# Patient Record
Sex: FEMALE | Race: WHITE | Employment: FULL TIME | URBAN - METROPOLITAN AREA
[De-identification: names, ages, dates, MRNs, and addresses within clinical notes are randomized per-mention and may not be internally consistent; named-entity substitution may affect disease eponyms.]

---

## 2019-01-01 ENCOUNTER — HOSPITAL ENCOUNTER (OUTPATIENT)
Dept: MAMMOGRAPHY | Age: 59
Discharge: HOME | End: 2019-11-14
Attending: INTERNAL MEDICINE
Payer: COMMERCIAL

## 2019-01-01 ENCOUNTER — APPOINTMENT (OUTPATIENT)
Dept: MAMMOGRAPHY | Age: 59
End: 2019-01-01
Attending: INTERNAL MEDICINE
Payer: COMMERCIAL

## 2019-01-01 DIAGNOSIS — R92.8 ABNORMAL MAMMOGRAM: ICD-10-CM

## 2019-01-01 PROCEDURE — 77065 DX MAMMO INCL CAD UNI: CPT

## 2019-01-01 PROCEDURE — 76642 ULTRASOUND BREAST LIMITED: CPT

## 2019-06-17 ENCOUNTER — HOSPITAL ENCOUNTER (OUTPATIENT)
Dept: NON INVASIVE DIAGNOSTICS | Age: 59
Discharge: HOME | End: 2019-06-17
Payer: COMMERCIAL

## 2019-06-17 DIAGNOSIS — I42.8 NICM (NONISCHEMIC CARDIOMYOPATHY) (HCC): ICD-10-CM

## 2019-06-17 PROCEDURE — 93306 TTE W/DOPPLER COMPLETE: CPT

## 2019-06-17 PROCEDURE — C8924 2D TTE W OR W/O FOL W/CON,FU: HCPCS

## 2019-07-08 PROBLEM — E66.01 OBESITY, MORBID (HCC): Status: ACTIVE | Noted: 2019-07-08

## 2019-08-17 ENCOUNTER — HOSPITAL ENCOUNTER (OUTPATIENT)
Dept: MAMMOGRAPHY | Age: 59
Discharge: HOME | End: 2019-08-17
Attending: INTERNAL MEDICINE
Payer: COMMERCIAL

## 2019-08-17 DIAGNOSIS — Z12.31 SCREENING MAMMOGRAM, ENCOUNTER FOR: ICD-10-CM

## 2019-08-17 PROCEDURE — 77067 SCR MAMMO BI INCL CAD: CPT

## 2019-08-17 NOTE — LETTER
[facility] [place] [address] [address] [place] [id number] PTWMM:[phone] Fax:    [phone] Date:  10/15/2019 RE: Patient: Yessenia Rossi : 1960 Done on: 19 Interpreted by: Rocky Vazquez MD   
  
  
Dear Dr. Cande Lainez, Thank you for allowing us to care for your patient. Yessenia Rossi had a breast imaging exam with us on 19. At that time a Additional Views Diagnostic Mammogram was recommended. The recommendations were due on 2019. A letter was previously sent to [address] [place] [address] of the need for the exam.  As of this date, we do not have record of this exam being performed. We would appreciate your assistance in contacting the patient and scheduling the appointment. It is required that we have a written order from you for the exam. Orders may be entered electronically or you may fax a copy to our facility. Please call [phone] or [phone] to schedule the appointment. Thank you for your assistance. Sincerely,  
  
[facility] for [facility]

## 2019-08-23 NOTE — PROGRESS NOTES
You need additional mammographic views of your right breast.  Have these been scheduled? This is precautionary. Thanks.   Albertina Roque

## 2019-11-15 NOTE — PROGRESS NOTES
Diagnostic mammogram revealed no cause for concern. Resume 12 month screening mammography schedule. Thanks.   Albertina Roque